# Patient Record
Sex: FEMALE | Race: BLACK OR AFRICAN AMERICAN | URBAN - METROPOLITAN AREA
[De-identification: names, ages, dates, MRNs, and addresses within clinical notes are randomized per-mention and may not be internally consistent; named-entity substitution may affect disease eponyms.]

---

## 2021-05-03 ENCOUNTER — TELEPHONE (OUTPATIENT)
Dept: FAMILY MEDICINE CLINIC | Age: 31
End: 2021-05-03

## 2021-05-03 NOTE — TELEPHONE ENCOUNTER
Called patient no answer left message letting her know I was calling to get her ready for her 2 PM NP VV and asked that she call the office back as soon as possible.

## 2021-05-03 NOTE — TELEPHONE ENCOUNTER
Called patient no answer left message letting her know I was calling her again to get her ready for her NP VV asked that she call the office back to reschedule her appt. Office number has been left.